# Patient Record
Sex: MALE | Race: WHITE | NOT HISPANIC OR LATINO | URBAN - METROPOLITAN AREA
[De-identification: names, ages, dates, MRNs, and addresses within clinical notes are randomized per-mention and may not be internally consistent; named-entity substitution may affect disease eponyms.]

---

## 2017-07-11 ENCOUNTER — GENERIC CONVERSION - ENCOUNTER (OUTPATIENT)
Dept: OTHER | Facility: OTHER | Age: 72
End: 2017-07-11

## 2017-07-17 ENCOUNTER — ALLSCRIPTS OFFICE VISIT (OUTPATIENT)
Dept: OTHER | Facility: OTHER | Age: 72
End: 2017-07-17

## 2017-07-24 ENCOUNTER — GENERIC CONVERSION - ENCOUNTER (OUTPATIENT)
Dept: OTHER | Facility: OTHER | Age: 72
End: 2017-07-24

## 2017-08-02 ENCOUNTER — GENERIC CONVERSION - ENCOUNTER (OUTPATIENT)
Dept: OTHER | Facility: OTHER | Age: 72
End: 2017-08-02

## 2018-01-10 NOTE — MISCELLANEOUS
Message  Lam Alfonso has a history of COPD and had moved to [place]  He is visiting his mother-in-law here in [place] and over the past few days has been experiencing increased exertional dyspnea and periodic wheezing over the past several days  No distress or new cough  No chest pain or fever  Has increased shortness of breath when bending over to tie his shoe  He is using Symbicort 160 mcg 2 puffs BID and albuterol inhaler  Last office visit with us was July 2015 when he was living in [place]  I will prescribe prednisone 40 mg x 5 days and then 20 mg x 5 days and will arrange for office visit        Plan  Acute exacerbation of chronic obstructive airways disease with asthma    · PredniSONE 20 MG Oral Tablet; 2 tablets per day for 5 days then 1 tab per day for  5 days    Signatures   Electronically signed by : ADOLFO Oleary ; Jul 11 2017  6:43PM EST                       (Author)

## 2018-01-11 NOTE — MISCELLANEOUS
Message  Patient called to have nebulizer order sent to [facility] in [place] and had given me the fax number located in the [facility]  Completed this task for the patient  Active Problems    1  Acute bronchitis, unspecified organism (466 0) (J20 9)   2  Acute exacerbation of chronic obstructive airways disease with asthma (493 22)   (J44 1,J45 901)   3  Anemia (285 9) (D64 9)   4  Arthropathy (716 90) (M12 9)   5  Benign essential hypertension (401 1) (I10)   6  Chronic liver disease (571 9) (K76 9)   7  Chronic obstructive pulmonary disease (496) (J44 9)   8  Colon polyp (211 3) (K63 5)   9  Colon, diverticulosis (562 10) (K57 30)   10  Depression screening (V79 0) (Z13 89)   11  Depression with anxiety (300 4) (F41 8)   12  Dermatophytoses (110 9) (B35 9)   13  Diabetes mellitus with ophthalmic manifestations (250 50) (E11 39)   14  Diabetic cataract (250 50,366 41) (E11 36)   15  Dizzy (780 4) (R42)   16  DMII (diabetes mellitus, type 2) (250 00) (E11 9)   17  Dyspnea on exertion (786 09) (R06 09)   18  Encounter for screening for malignant neoplasm of colon (V76 51) (Z12 11)   19  Encounter for screening for other nervous system disorder (V80 09) (Z13 858)   20  Encounter for special screening examination for genitourinary disorder (V81 6) (Z13 89)   21  Esophageal reflux (530 81) (K21 9)   22  Flu vaccine need (V04 81) (Z23)   23  Foot pain, right (729 5) (M79 671)   24  Foot pain, right (729 5) (M79 671)   25  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)   26  Hypercholesterolemia (272 0) (E78 00)   27  Hypothyroidism (244 9) (E03 9)   28  Impacted cerumen of left ear (380 4) (H61 22)   29  Insomnia (780 52) (G47 00)   30  Long-term insulin use (V58 67) (Z79 4)   31  Lumbago (724 2) (M54 5)   32  Mixed hyperlipidemia (272 2) (E78 2)   33  Morbid or severe obesity due to excess calories (278 01) (E66 01)   34  Need for influenza vaccination (V04 81) (Z23)   35  Need for pneumococcal vaccination (V03 82) (Z23)   36  Non-proliferative diabetic retinopathy (250 50,362 03) (E11 3299)   37  Obstructive sleep apnea (327 23) (G47 33)   38  Organic impotence (607 84) (N52 9)   39  Osteoarthritis, localized, knee (715 36) (M17 10)   40  Plantar fasciitis of right foot (728 71) (M72 2)   41  Primary osteoarthritis of right knee (715 16) (M17 11)   42  Psoriasis (696 1) (L40 9)   43  Pulmonary artery hypertension (416 8) (I27 2)   44  Rectal itching (698 0) (L29 0)   45  Renal insufficiency (593 9) (N28 9)   46  Restless legs syndrome (333 94) (G25 81)   47  Right knee pain (719 46) (M25 561)   48  Sleep apnea (780 57) (G47 30)   49  Trigger finger (727 03) (M65 30)    Current Meds   1  Albuterol Sulfate (2 5 MG/3ML) 0 083% Inhalation Nebulization Solution; USE 1 UNIT   DOSE IN NEBULIZER 4 TIMES DAILY; Therapy: 62ROC6305 to (NASSQNJD:04XVZ9102)  Requested for: 26XWE5533; Last   Rx:47Rnc6798 Ordered   2  Aspir-81 81 MG Oral Tablet Delayed Release; Twice Weekly Recorded   3  Chucho Contour Next Test In [employer]; test 4 times daily; Therapy: 51WUV0365 to (Karly Daniel)  Requested for: 18GND6003; Last   Rx:33Gzf8818 Ordered   4  Chucho Contour Test In [employer]; USE 1 STRIP AS DIRECTED FOUR TIMES A DAY; Therapy: 76YDN0449 to (Last Rx:30Nov2015)  Requested for: 92PDY3658 Ordered   5  BD Pen Needle Gogo U/F 32G X 4 MM Miscellaneous; Injects self with insulin 4 times   daily  ; Therapy: 11FLP3777 to (Last Rx:19Oct2015)  Requested for: 19Oct2015 Ordered   6  Clotrimazole-Betamethasone 1-0 05 % External Cream (Lotrisone); APPLY TO THE   AFECTED AREA TWICE DAILY; Therapy: 79KKH2154 to (Evaluate:23Oct2015)  Requested for: 37CZD3950; Last   Rx:08Oct2015 Ordered   7  Glucosamine Chondroitin Complx Oral Capsule; 2 daily; Therapy: (Recorded:11Nov2013) to Recorded   8   HumaLOG KwikPen 100 UNIT/ML Subcutaneous Solution Pen-injector; INJECT 12   UNITS BEFORE BREAKFAST, 12 UNITS BEFORE LUNCH AND 35 UNITS BEFORE   SUPPER; Therapy: 16Bgu6775 to (Last Rx:62Maw9754)  Requested for: 19Zgl4660 Ordered   9  Levothyroxine Sodium 75 MCG Oral Tablet; Take 1 tablet daily; Therapy: 38GAI9484 to (Last Rx:24Ffz6297)  Requested for: 28Dec2015 Ordered   10  Lisinopril 40 MG Oral Tablet; 1 every day; Therapy: 60UJK5523 to (Last Rx:84Cga7507)  Requested for: 23Dbk9297 Ordered   11  MetFORMIN HCl ER (OSM) 500 MG Oral Tablet Extended Release 24 Hour; Take 2    tablets twice daily; Therapy: (Recorded:35Twv4789) to Recorded   12  Multiple Vitamin TABS; Bariatrics  Fusion QID; Therapy: (Aditi Saldivar) to Recorded   13  Omega-3-acid Ethyl Esters 1 GM Oral Capsule; TAKE 2 CAPSULES TWICE A DAY; Therapy: 33Rap5569 to (Last Rx:69Zph5804)  Requested for: 78CDL9233 Ordered   14  Omeprazole 20 MG Oral Capsule Delayed Release; take 1 capsule twice a day; Therapy: 74UAR5116 to (Last Rx:05Vie1520)  Requested for: 06Skh1279 Ordered   15  Proventil  (90 Base) MCG/ACT Inhalation Aerosol Solution; TAKE 2 PUFFS    EVERY 4 HOURS AS NEEDED FOR WHEEZING, COUGHING, OR SHORTNESS OF    BREATH; Therapy: 10BNA6679 to Recorded   16  Qsymia 7 5-46 MG Oral Capsule Extended Release 24 Hour; Take one tablet daily; Therapy: 08ZWZ8203 to (03 17 74 30 53); Last Rx:88Cmv1137 Ordered   17  Rozerem 8 MG Oral Tablet; TAKE 1 TABLET AT BEDTIME; Therapy: 23XYW4707 to (Evaluate:27Jan2016)  Requested for: 25Jtk8418; Last    Rx:48Fpv6824 Ordered   18  Simvastatin 20 MG Oral Tablet; take 1 tablet daily at bedtime; Therapy: 05ZQJ6307 to (Last Rx:19Oct2015)  Requested for: 19Oct2015 Ordered   19  Spiriva HandiHaler 18 MCG Inhalation Capsule; INHALE CONTENTS OF CAPSULE    ONCE DAY; Therapy: 47BKY0313 to (OPASZMQY:25WIR1775); Last Rx:09Rbq0847 Ordered   20  Symbicort 160-4 5 MCG/ACT Inhalation Aerosol; INHALE 2 PUFFS TWICE DAILY  RINSE MOUTH AFTER USE; Therapy: 30VDL4768 to (AWMUBEYA:05JQK2965) Recorded   21   Toujeo SoloStar 300 UNIT/ML Subcutaneous Solution Pen-injector; 65 UNITS DAILY; Therapy: 92CHB3558 to (Evaluate:06Jqz5701) Recorded   22  Venlafaxine HCl  MG Oral Capsule Extended Release 24 Hour (Effexor XR); 1    every day; Therapy: 78EUL0933 to (Francisca Heredia)  Requested for: 44Cun6628; Last    Rx:47Tex0573 Ordered   23  Viagra 100 MG Oral Tablet; TAKE 1 TABLET ONE HOUR PRIOR TO SEXUAL ACTIVITY; Therapy: 18QRK6372 to (Last Rx:20Nov2014)  Requested for: 20Nov2014 Ordered   24  Victoza 18 MG/3ML Subcutaneous Solution Pen-injector; INJECT 1 8 MG UNDER THE    SKIN DAILY; Therapy: 30QZG8577 to (Last Rx:19Mar2015)  Requested for: 28Dec2015 Ordered   25  Vitamin D 2000 UNIT Oral Tablet; Therapy: (Recorded:28Wwn8728) to Recorded    Allergies    1  Levaquin TABS    Signatures   Electronically signed by :  Mel Evangelista, ; Aug  2 2017 11:22AM EST                       (Author)

## 2018-01-12 VITALS
OXYGEN SATURATION: 95 % | RESPIRATION RATE: 12 BRPM | SYSTOLIC BLOOD PRESSURE: 148 MMHG | HEART RATE: 81 BPM | BODY MASS INDEX: 46.65 KG/M2 | TEMPERATURE: 98.4 F | DIASTOLIC BLOOD PRESSURE: 72 MMHG | WEIGHT: 280 LBS | HEIGHT: 65 IN

## 2020-03-16 ENCOUNTER — TELEPHONE (OUTPATIENT)
Dept: FAMILY MEDICINE CLINIC | Facility: CLINIC | Age: 75
End: 2020-03-16

## 2020-03-16 NOTE — TELEPHONE ENCOUNTER
Pt moved to [place] and called us to find out if somewhere in his chart it would have his blood type? He had some surgeries while a pt of ours so he thought we might  Call back when able

## 2020-03-17 NOTE — TELEPHONE ENCOUNTER
This patient has not been seen in over 3 yrs and BW has not transfer into the new system  HE would have to call his insurence to find out if its even covered    Please call patient back and inform him  Meryle Broaden, [place]

## 2020-03-17 NOTE — TELEPHONE ENCOUNTER
Patient didn't ask for bw to find out his type of blood, he just wants to know if in the records we have have it somewhere  He has a lot of BW from back to look at I just don't know what Im looking for, and yes he hasn't been seen here in a while that is because he moved to [place] as I had stated  Please look back and check and then call the pt to let him know if we have it or not